# Patient Record
Sex: MALE | Race: WHITE | NOT HISPANIC OR LATINO | URBAN - METROPOLITAN AREA
[De-identification: names, ages, dates, MRNs, and addresses within clinical notes are randomized per-mention and may not be internally consistent; named-entity substitution may affect disease eponyms.]

---

## 2017-05-03 ENCOUNTER — APPOINTMENT (RX ONLY)
Dept: URBAN - METROPOLITAN AREA CLINIC 304 | Facility: CLINIC | Age: 57
Setting detail: DERMATOLOGY
End: 2017-05-03

## 2017-05-03 DIAGNOSIS — L08.1 ERYTHRASMA: ICD-10-CM

## 2017-05-03 DIAGNOSIS — Z85.820 PERSONAL HISTORY OF MALIGNANT MELANOMA OF SKIN: ICD-10-CM

## 2017-05-03 PROBLEM — Z85.46 PERSONAL HISTORY OF MALIGNANT NEOPLASM OF PROSTATE: Status: ACTIVE | Noted: 2017-05-03

## 2017-05-03 PROBLEM — E13.9 OTHER SPECIFIED DIABETES MELLITUS WITHOUT COMPLICATIONS: Status: ACTIVE | Noted: 2017-05-03

## 2017-05-03 PROBLEM — H91.90 UNSPECIFIED HEARING LOSS, UNSPECIFIED EAR: Status: ACTIVE | Noted: 2017-05-03

## 2017-05-03 PROBLEM — M12.9 ARTHROPATHY, UNSPECIFIED: Status: ACTIVE | Noted: 2017-05-03

## 2017-05-03 PROCEDURE — ? PRESCRIPTION

## 2017-05-03 PROCEDURE — ? OBSERVATION

## 2017-05-03 PROCEDURE — 99203 OFFICE O/P NEW LOW 30 MIN: CPT

## 2017-05-03 RX ORDER — CLARITHROMYCIN 250 MG/1
TABLET, FILM COATED ORAL
Qty: 28 | Refills: 2 | Status: ERX | COMMUNITY
Start: 2017-05-03

## 2017-05-03 RX ORDER — ERYTHROMYCIN 20 MG/G
GEL TOPICAL BID
Qty: 1 | Refills: 3 | Status: ERX | COMMUNITY
Start: 2017-05-03

## 2017-05-03 RX ADMIN — ERYTHROMYCIN: 20 GEL TOPICAL at 15:47

## 2017-05-03 RX ADMIN — CLARITHROMYCIN: 250 TABLET, FILM COATED ORAL at 15:47

## 2017-05-03 ASSESSMENT — LOCATION SIMPLE DESCRIPTION DERM
LOCATION SIMPLE: LEFT THIGH
LOCATION SIMPLE: RIGHT THIGH

## 2017-05-03 ASSESSMENT — LOCATION ZONE DERM: LOCATION ZONE: LEG

## 2017-05-03 ASSESSMENT — LOCATION DETAILED DESCRIPTION DERM
LOCATION DETAILED: LEFT ANTERIOR PROXIMAL THIGH
LOCATION DETAILED: RIGHT ANTERIOR PROXIMAL THIGH

## 2017-05-03 NOTE — HPI: FULL BODY SKIN EXAMINATION
What Is The Reason For Today's Visit?: Full Body Skin Examination
What Is The Reason For Today's Visit? (Being Monitored For X): the risk of recurrence of previously treated lesion(s)
How Severe Are Your Spot(S)?: moderate

## 2017-05-03 NOTE — PROCEDURE: OBSERVATION
X Size Of Lesion In Cm (Optional): 0
Morphology Per Location (Optional): No recurrence
Detail Level: Zone

## 2018-02-20 ENCOUNTER — RX ONLY (OUTPATIENT)
Age: 58
Setting detail: RX ONLY
End: 2018-02-20

## 2018-02-20 RX ORDER — ERYTHROMYCIN 20 MG/G
GEL TOPICAL BID
Qty: 1 | Refills: 3 | Status: CANCELLED
Stop reason: SDUPTHER

## 2018-02-20 RX ORDER — ERYTHROMYCIN 20 MG/G
GEL TOPICAL BID
Qty: 1 | Refills: 3 | Status: ERX

## 2018-02-21 ENCOUNTER — RX ONLY (OUTPATIENT)
Age: 58
Setting detail: RX ONLY
End: 2018-02-21

## 2018-02-21 RX ORDER — ERYTHROMYCIN 20 MG/G
GEL TOPICAL BID
Qty: 1 | Refills: 3 | Status: ERX